# Patient Record
Sex: MALE | Race: WHITE | NOT HISPANIC OR LATINO | Employment: FULL TIME | URBAN - METROPOLITAN AREA
[De-identification: names, ages, dates, MRNs, and addresses within clinical notes are randomized per-mention and may not be internally consistent; named-entity substitution may affect disease eponyms.]

---

## 2018-05-04 ENCOUNTER — HOSPITAL ENCOUNTER (EMERGENCY)
Facility: MEDICAL CENTER | Age: 33
End: 2018-05-04
Attending: EMERGENCY MEDICINE
Payer: COMMERCIAL

## 2018-05-04 ENCOUNTER — APPOINTMENT (OUTPATIENT)
Dept: RADIOLOGY | Facility: MEDICAL CENTER | Age: 33
End: 2018-05-04
Attending: EMERGENCY MEDICINE
Payer: COMMERCIAL

## 2018-05-04 ENCOUNTER — NON-PROVIDER VISIT (OUTPATIENT)
Dept: OCCUPATIONAL MEDICINE | Facility: CLINIC | Age: 33
End: 2018-05-04
Payer: COMMERCIAL

## 2018-05-04 VITALS
HEART RATE: 77 BPM | BODY MASS INDEX: 29.86 KG/M2 | TEMPERATURE: 98 F | WEIGHT: 220.46 LBS | OXYGEN SATURATION: 98 % | DIASTOLIC BLOOD PRESSURE: 74 MMHG | SYSTOLIC BLOOD PRESSURE: 123 MMHG | HEIGHT: 72 IN | RESPIRATION RATE: 18 BRPM

## 2018-05-04 DIAGNOSIS — S62.610A CLOSED DISPLACED FRACTURE OF PROXIMAL PHALANX OF RIGHT INDEX FINGER, INITIAL ENCOUNTER: ICD-10-CM

## 2018-05-04 DIAGNOSIS — Z02.1 PRE-EMPLOYMENT DRUG SCREENING: ICD-10-CM

## 2018-05-04 DIAGNOSIS — Z02.83 ENCOUNTER FOR DRUG SCREENING: ICD-10-CM

## 2018-05-04 PROCEDURE — 82075 ASSAY OF BREATH ETHANOL: CPT | Performed by: INTERNAL MEDICINE

## 2018-05-04 PROCEDURE — 99284 EMERGENCY DEPT VISIT MOD MDM: CPT

## 2018-05-04 PROCEDURE — 80305 DRUG TEST PRSMV DIR OPT OBS: CPT | Performed by: INTERNAL MEDICINE

## 2018-05-04 PROCEDURE — 73140 X-RAY EXAM OF FINGER(S): CPT | Mod: RT

## 2018-05-04 PROCEDURE — 8899 PR URINE 11 PANEL - AFTER HOURS: Performed by: INTERNAL MEDICINE

## 2018-05-04 ASSESSMENT — PAIN SCALES - GENERAL
PAINLEVEL_OUTOF10: 3
PAINLEVEL_OUTOF10: 3

## 2018-05-04 NOTE — LETTER
FORM C-4:  EMPLOYEE’S CLAIM FOR COMPENSATION/ REPORT OF INITIAL TREATMENT  EMPLOYEE’S CLAIM - PROVIDE ALL INFORMATION REQUESTED   First Name  Hemanth Last Name  Rose Birthdate             Age  1985 32 y.o. 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 (if applicable)  Working on a vehicle    How did this injury or occupational disease occur? Be specific and answer in detail. Use additional sheet if necessary)  using a large hammer to drive axle into transmission /missed and hit Right hand   If you believe that you have an occupational disease, when did you first have knowledge of the disability and it relationship to your employment?  N/A Witnesses to the Accident  Joseph/beatriz/oscar     Nature of Injury or Occupational Disease  Workers' Compensation  Part(s) of Body Injured or Affected  Finger (R), N/A, N/A    I certify that the above is true and correct to the best of my knowledge and that I have provided this  information in order to obtain the benefits of [place] or [place] of [place]).  I hereby authorize any physician, chiropractor, surgeon, practitioner, or other person, any hospital, including [facility] or [facility], any medical service organization, any insurance company, or other institution or organization to release to each other, any medical or other information, including benefits paid or payable, pertinent to this injury or disease, except information relative to diagnosis, treatment and/or counseling for AIDS, psychological conditions, alcohol or controlled substances, for which I must give specific authorization.  A Photostat of this authorization shall be as valid as the original.   Date  05/04/2018 Place  [facility]   Employee’s Signature   THIS REPORT MUST BE COMPLETED AND MAILED WITHIN 3 WORKING DAYS OF TREATMENT   Place  [facility], EMERGENCY DEPT  Name of Facility   [facility]   Date  5/4/2018 Diagnosis  (S62.610A) Closed displaced fracture of proximal phalanx of right index finger, initial encounter Is there evidence the injured employee was under the influence of alcohol and/or another controlled substance at the time of accident?   Hour  9:59 PM Description of Injury or Disease  Closed displaced fracture of proximal phalanx of right index finger, initial encounter No   Treatment  Aluminum/foam splint  Have you advised the patient to remain off work five days or more?         No   X-Ray Findings  Positive  Comments:right index finger proximal phalanx linear fractures comminuted   If Yes   From Date    To Date      From information given by the employee, together with medical evidence, can you directly connect this injury or occupational disease as job incurred?  Yes If No, is the employee capable of: Full Duty  No Modified Duty  Yes   Is additional  "medical care by a physician indicated?  Yes  Comments:hand surgeon/orthopedist If Modified Duty, Specify any Limitations / Restrictions  No use of right index finger     Do you know of any previous injury or disease contributing to this condition or occupational disease?  No   Date  5/4/2018 Print Doctor’s Name  Rodolfo Govea certify the employer’s copy of this form was mailed on:   Address  [address] [place] [address] [phone] Insurer’s Use Only   [facility] [employer]  [id number]    Provider’s Tax ID Number  [id number] Telephone  Dept: [phone]    Doctor’s Signature  e-RODOLFO Newman D.O. Degree   MD    Original - TREATING PHYSICIAN OR CHIROPRACTOR   Pg 2-Insurer/TPA   Pg 3-Employer   Pg 4-Employee                                                                                                  Form C-4 (rev01/03)     BRIEF DESCRIPTION OF RIGHTS AND BENEFITS  (Pursuant to [place]RS 616C.050)    Notice of Injury or Occupational Disease (Incident Report Form C-1): If an injury or occupational disease (OD) arises out of and in the course of employment, you must provide written notice to your employer as soon as practicable, but no later than 7 days after the accident or OD. Your employer shall maintain a sufficient supply of the required forms.    Claim for Compensation (Form C-4): If medical treatment is sought, the form C-4 is available at the place of initial treatment. A completed \"Claim for Compensation\" (Form C-4) must be filed within 90 days after an accident or OD. The treating physician or chiropractor must, within 3 working days after treatment, complete and mail to the employer, the employer's insurer and third-party , the Claim for Compensation.    Medical Treatment: If you require medical treatment for your on-the-job injury or OD, you may be required to select a physician or chiropractor from a list provided by your workers’ compensation insurer, if it " has contracted with an Organization for Managed Care (MCO) or Preferred Provider Organization (PPO) or providers of health care. If your employer has not entered into a contract with an MCO or PPO, you may select a physician or chiropractor from the Panel of Physicians and Chiropractors. Any medical costs related to your industrial injury or OD will be paid by your insurer.    Temporary Total Disability (TTD): If your doctor has certified that you are unable to work for a period of at least 5 consecutive days, or 5 cumulative days in a 20-day period, or places restrictions on you that your employer does not accommodate, you may be entitled to TTD compensation.    Temporary Partial Disability (TPD): If the wage you receive upon reemployment is less than the compensation for TTD to which you are entitled, the insurer may be required to pay you TPD compensation to make up the difference. TPD can only be paid for a maximum of 24 months.    Permanent Partial Disability (PPD): When your medical condition is stable and there is an indication of a PPD as a result of your injury or OD, within 30 days, your insurer must arrange for an evaluation by a rating physician or chiropractor to determine the degree of your PPD. The amount of your PPD award depends on the date of injury, the results of the PPD evaluation and your age and wage.    Permanent Total Disability (PTD): If you are medically certified by a treating physician or chiropractor as permanently and totally disabled and have been granted a PTD status by your insurer, you are entitled to receive monthly benefits not to exceed 66 2/3% of your average monthly wage. The amount of your PTD payments is subject to reduction if you previously received a PPD award.    Vocational Rehabilitation Services: You may be eligible for vocational rehabilitation services if you are unable to return to the job due to a permanent physical impairment or permanent restrictions as a result  of your injury or occupational disease.    Transportation and Per Maylin Reimbursement: You may be eligible for travel expenses and per maylin associated with medical treatment.  Reopening: You may be able to reopen your claim if your condition worsens after claim closure.    Appeal Process: If you disagree with a written determination issued by the insurer or the insurer does not respond to your request, you may appeal to the Department of Administration, , by following the instructions contained in your determination letter. You must appeal the determination within 70 days from the date of the determination letter at [address], [address], [place], [place] [address], or [address], [address], [place], [place] [address]. If you disagree with the  decision, you may appeal to the Department of Administration, . You must file your appeal within 30 days from the date of the  decision letter at [address], [address], [place], [place] [address], or [address] [facility] [address], [place] [address]. If you disagree with a decision of an , you may file a petition for judicial review with the District Court. You must do so within 30 days of the Appeal Officer’s decision. You may be represented by an  at your own expense or you may contact the [facility] for possible representation.    [place]  for Injured Workers (NAIW): If you disagree with a  decision, you may request that NAIW represent you without charge at an  Hearing. For information regarding denial of benefits, you may contact the [facility] at: [address], [address] [address], [phone], or [address] [facility] [address], [place] [address], [phone]    To File a Complaint with the Division: If you wish to file a complaint with the  of the Division of Industrial Relations  (DIR), please contact the Workers’ Compensation Section, [address], [address], [place], [place] [address], telephone [phone]) [phone], or [address], [address], [place], [place] [address], telephone [phone].    For assistance with Workers’ Compensation Issues: you may contact the Office of the [facility] Consumer Health Assistance, [address] [place] [address], [address], [place], [place] [address], Toll Free 1-625.596.7055, Web site: http://govcha.[facility].nv., E-mail [email or web address].nv.                                                                                                                                                                               __________________________________________________________________                                    _________________            Employee Name / Signature                                                                                                                            Date                                       D-2 (rev. 10/07)

## 2018-05-05 NOTE — DISCHARGE INSTRUCTIONS
Finger Fracture  Fractures of fingers are breaks in the bones of the fingers. There are many types of fractures. There are different ways of treating these fractures. Your health care provider will discuss the best way to treat your fracture.  What are the causes?  Traumatic injury is the main cause of broken fingers. These include:  · Injuries while playing sports.  · Workplace injuries.  · Falls.  What increases the risk?  Activities that can increase your risk of finger fractures include:  · Sports.  · Workplace activities that involve machinery.  · A condition called osteoporosis, which can make your bones less dense and cause them to fracture more easily.  What are the signs or symptoms?  The main symptoms of a broken finger are pain and swelling within 15 minutes after the injury. Other symptoms include:  · Bruising of your finger.  · Stiffness of your finger.  · Numbness of your finger.  · Exposed bones (compound fracture) if the fracture is severe.  How is this diagnosed?  The best way to diagnose a broken bone is with X-ray imaging. Additionally, your health care provider will use this X-ray image to evaluate the position of the broken finger bones.  How is this treated?  Finger fractures can be treated with:  · Nonreduction--This means the bones are in place. The finger is splinted without changing the positions of the bone pieces. The splint is usually left on for about a week to 10 days. This will depend on your fracture and what your health care provider thinks.  · Closed reduction--The bones are put back into position without using surgery. The finger is then splinted.  · Open reduction and internal fixation--The fracture site is opened. Then the bone pieces are fixed into place with pins or some type of hardware. This is seldom required. It depends on the severity of the fracture.  Follow these instructions at home:  · Follow your health care provider's instructions regarding activities, exercises,  and physical therapy.  · Only take over-the-counter or prescription medicines for pain, discomfort, or fever as directed by your health care provider.  Contact a health care provider if:  You have pain or swelling that limits the motion or use of your fingers.  Get help right away if:  Your finger becomes numb.  This information is not intended to replace advice given to you by your health care provider. Make sure you discuss any questions you have with your health care provider.  Document Released: 04/01/2002 Document Revised: 05/25/2017 Document Reviewed: 07/30/2014  [employer] Interactive Patient Education © 2017 [employer] Inc.

## 2018-05-05 NOTE — ED NOTES
Patient was educated on discharge instructions.  Patient was informed about diagnosis, symptom management, risks, and home care instructions.  Patient verbalized understanding and signed discharge instructions. Copy of discharge instructions in chart.  Patient ambulated out with steady gait,  Patient has personal belongings and CD with images.

## 2018-05-05 NOTE — ED TRIAGE NOTES
Chief Complaint   Patient presents with   • Digit Pain     Pt was at work, hit finger with a hammer. R second digit swollen, limited ROM.     /73   Pulse 74   Temp 36.7 °C (98 °F) (Temporal)   Resp 16   Ht 1.829 m (6')   Wt 100 kg (220 lb 7.4 oz)   SpO2 98%   BMI 29.90 kg/m²     Pt Informed regarding triage process and verbalized understanding to inform triage tech or RN for any changes in condition.  Placed in lobby.

## 2018-05-05 NOTE — ED PROVIDER NOTES
ED Provider Note    Scribed for Asa Govea D.O. by Douglas Hutton. 5/4/2018  8:05 PM    Primary care provider: Pcp Pt States None  Means of arrival: Private vehicle  History obtained from: Patient  History limited by: None    CHIEF COMPLAINT  Chief Complaint   Patient presents with   • Digit Pain     HPI  Hemanth Rose is a 32 y.o. male who presents to the Emergency Department with right 2nd finger pain. The patient was at work about one hour prior to arrival when he accidentally hit his right 2nd finger digit with a hammer. He complains of constant right 2nd finger pain since the injury which is worst in severity over the his right knuckle. The patient denies medicating his pain with any medications.   No loss of sensation or loss of strength.   The patient is left hand dominant.    REVIEW OF SYSTEMS  Pertinent positives include right index finger pain. Pertinent negatives include no loss of sensation or strength.      PAST MEDICAL HISTORY  Past Medical History:   Diagnosis Date   • Patient denies medical problems      SURGICAL HISTORY  History reviewed. No pertinent surgical history.     SOCIAL HISTORY  Social History   Substance Use Topics   • Smoking status: Never Smoker   • Smokeless tobacco: Never Used   • Alcohol use No      History   Drug Use No     FAMILY HISTORY  History reviewed. No pertinent family history.    CURRENT MEDICATIONS  Home Medications     Reviewed by Christina Crews R.N. (Registered Nurse) on 05/04/18 at 1814  Med List Status: <None>   Medication Last Dose Status        Patient Gilles Taking any Medications                     ALLERGIES  No Known Allergies    PHYSICAL EXAM  VITAL SIGNS: /73   Pulse 74   Temp 36.7 °C (98 °F) (Temporal)   Resp 16   Ht 1.829 m (6')   Wt 100 kg (220 lb 7.4 oz)   SpO2 98%   BMI 29.90 kg/m²     Constitutional: Well developed, Well nourished, No acute distress, Non-toxic appearance.   Skin: Warm, Dry, No erythema, slight ecchymosis on the right  index finger the proximal phalanx   Extremities: Edema and tenderness to proximal phalanx of right index finger. Distal cap refill less than 2 sec.   Neurologic: Alert & oriented x 3,  No focal deficits noted, normal gait. Strength and sensation intact.     DIAGNOSTIC STUDIES/PROCEDURES    RADIOLOGY  DX-FINGER(S) 2+ RIGHT   Final Result         1.  Hairline index finger proximal phalanx diaphyseal fracture with mild comminution.        The radiologist's interpretation of all radiological studies have been reviewed by me.    COURSE & MEDICAL DECISION MAKING  Nursing notes, VS, PMSFHx reviewed in chart.     8:05 PM - Patient seen and examined at bedside. Ordered finger x-ray to evaluate his symptoms.     This is a charming 32 y.o. male that presents with slated fracture to the proximal phalanx of the right index finger. The patient has no neurological vascular deficits. He is placed in a finger immobilizer/aluminum/foam splint. The patient will be following up with Workmen's Compensation for further evaluation and management and he did not want any pain medication.      DISPOSITION:  Patient will be discharged home in stable condition.    FOLLOW UP:  [address] [address]  [phone]    Schedule an appointment as soon as possible for a visit in 1 day      [facility], Emergency Dept  [address] [address] [phone]    If symptoms worsen      OUTPATIENT MEDICATIONS:  New Prescriptions    No medications on file     FINAL IMPRESSION  1. Closed displaced fracture of proximal phalanx of right index finger, initial encounter          Douglas SPEAR (Hector), am scribing for, and in the presence of, Asa Govea D.O.    Electronically signed by: Douglas Hutton (Hector), 5/4/2018    Asa SPEAR D.O. personally performed the services described in this documentation, as scribed by Douglas Hutton in my presence, and it is both accurate  and complete.    The note accurately reflects work and decisions made by me.  Asa Govea  5/4/2018  10:22 PM

## 2018-05-07 ENCOUNTER — OCCUPATIONAL MEDICINE (OUTPATIENT)
Dept: OCCUPATIONAL MEDICINE | Facility: CLINIC | Age: 33
End: 2018-05-07
Payer: COMMERCIAL

## 2018-05-07 VITALS
DIASTOLIC BLOOD PRESSURE: 82 MMHG | TEMPERATURE: 98 F | BODY MASS INDEX: 29.8 KG/M2 | HEIGHT: 72 IN | HEART RATE: 86 BPM | WEIGHT: 220 LBS | SYSTOLIC BLOOD PRESSURE: 138 MMHG | RESPIRATION RATE: 16 BRPM | OXYGEN SATURATION: 98 %

## 2018-05-07 DIAGNOSIS — S62.640D CLOSED NONDISPLACED FRACTURE OF PROXIMAL PHALANX OF RIGHT INDEX FINGER WITH ROUTINE HEALING, SUBSEQUENT ENCOUNTER: ICD-10-CM

## 2018-05-07 PROCEDURE — 99202 OFFICE O/P NEW SF 15 MIN: CPT | Performed by: PREVENTIVE MEDICINE

## 2018-05-07 ASSESSMENT — PAIN SCALES - GENERAL: PAINLEVEL: 3=SLIGHT PAIN

## 2018-05-07 NOTE — LETTER
[facility]  [address]   [address] - [place] [address]  Phone:  [phone] - Fax:  [phone]   Occupational Health [facility] Progress Report and Disability Certification  Date of Service: 5/7/2018   No Show:  No  Date / Time of Next Visit: 5/14/2018@1:00pm   Claim Information   Patient Name: Hemanth Rose  Claim Number:     Employer: [employer]  Date of Injury: 5/4/2018     Insurer / TPA: [employer]  ID / SSN:     Occupation: Technician  Diagnosis: The encounter diagnosis was Closed nondisplaced fracture of proximal phalanx of right index finger with routine healing, subsequent encounter.    Medical Information   Related to Industrial Injury? Yes    Subjective Complaints:  DOI 5/4/2018: 31 yo male presents for right index finger injury. He accidentally hit his right index finger digit with a hammer. He was seen in the ER where they noted a he was seen in the ER where x-rays were performed and found slightly comminuted fracture of the proximal phalanx. He was given an aluminum finger splint and advised to follow up with occupational health. Patient states that overall the pain is somewhat reduced however discontinue have pain. He has not tried to move it very much. He states he has not required any medications for the pain. 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 Denies any numbness or tingling. Denies open wounds.   Objective Findings: Right index finger: Swelling over the proximal phalanx. Tender over the proximal phalanx. Limited MCP and IP joint movement. Reports slight decrease in sensation in the radial aspect of digit.   Pre-Existing Condition(s):     Assessment:   Condition Improved    Status: Additional Care Required  Permanent Disability:No    Plan:      Diagnostics:      Comments:  Continue aluminum finger splint. May do gentle range of motion exercises once to twice per day.  OTC ibuprofen and/or Tylenol as  needed  Restricted duty  Follow-up one week    Disability Information   Status: Released to Restricted Duty    From:  5/7/2018  Through: 5/14/2018 Restrictions are: Temporary   Physical Restrictions   Sitting:    Standing:    Stooping:    Bending:      Squatting:    Walking:    Climbing:    Pushing:      Pulling:    Other:    Reaching Above Shoulder (L):   Reaching Above Shoulder (R):       Reaching Below Shoulder (L):    Reaching Below Shoulder (R):      Not to exceed Weight Limits   Carrying(hrs):   Weight Limit(lb):   Lifting(hrs):   Weight  Limit(lb):     Comments: Accommodate for finger splint. Limited use of the right hand as tolerated.    Repetitive Actions   Hands: i.e. Fine Manipulations from Grasping:     Feet: i.e. Operating Foot Controls:     Driving / Operate Machinery:     Physician Name: Fernando Flores D.O. Physician Signature: FERNANDO Page D.O. e-Signature: Dr. Todd Chappell, Medical Director   Clinic Name / Location: [address],   [address] [place] [address] Clinic Phone Number: Dept: [phone]   Appointment Time: 2:30 Pm Visit Start Time: 2:47 PM   Check-In Time:  2:12 Pm Visit Discharge Time:  3:26pm   Original-Treating Physician or Chiropractor    Page 2-Insurer/TPA    Page 3-Employer    Page 4-Employee

## 2018-05-07 NOTE — PROGRESS NOTES
Subjective:      Hemanth Rose is a 32 y.o. male who presents with Other (WC [employer] DOI 5/4/18, (R) first finger, better, rm 25)      DOI 5/4/2018: 31 yo male presents for right index finger injury. He accidentally hit his right index finger digit with a hammer. He was seen in the ER where they noted a he was seen in the ER where x-rays were performed and found slightly comminuted fracture of the proximal phalanx. He was given an aluminum finger splint and advised to follow up with occupational health. Patient states that overall the pain is somewhat reduced however discontinue have pain. He has not tried to move it very much. He states he has not required any medications for the pain. 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 Denies any numbness or tingling. Denies open wounds.     HPI    ROS  ROS: All systems were reviewed on intake form, form was reviewed and signed. See scanned documents in media. Pertinent positives and negatives included in HPI.    PMH: No pertinent past medical history to this problem  MEDS: Medications were reviewed in Epic  ALLERGIES: No Known Allergies  SOCHX: Works as a technician at [employer]   FH: No pertinent family history to this problem     Objective:     /82   Pulse 86   Temp 36.7 °C (98 °F)   Resp 16   Ht 1.829 m (6')   Wt 99.8 kg (220 lb)   SpO2 98%   BMI 29.84 kg/m²      Physical Exam   Constitutional: He is oriented to person, place, and time. He appears well-developed and well-nourished.   Cardiovascular: Normal rate.    Pulmonary/Chest: Effort normal.   Neurological: He is alert and oriented to person, place, and time.   Skin: Skin is warm and dry.   Psychiatric: He has a normal mood and affect. Judgment normal.       Right index finger: Swelling over the proximal phalanx. Tender over the proximal phalanx. Limited MCP and IP joint movement. Reports slight decrease in sensation in the radial aspect of digit.       Assessment/Plan:      1. Closed nondisplaced fracture of proximal phalanx of right index finger with routine healing, subsequent encounter  Continue aluminum finger splint. May do gentle range of motion exercises once to twice per day.   OTC ibuprofen and/or Tylenol as needed   Restricted duty   Follow-up one week

## 2018-05-08 LAB
AMP AMPHETAMINE: NORMAL
BAR BARBITURATES: NORMAL
BREATH ALCOHOL COMMENT: NORMAL
BZO BENZODIAZEPINES: NORMAL
COC COCAINE: NORMAL
INT CON NEG: NEGATIVE
INT CON POS: POSITIVE
MDMA ECSTASY: NORMAL
MET METHAMPHETAMINES: NORMAL
MTD METHADONE: NORMAL
OPI OPIATES: NORMAL
OXY OXYCODONE: NORMAL
PCP PHENCYCLIDINE: NORMAL
POC BREATHALIZER: 0 PERCENT (ref 0–0.01)
POC URINE DRUG SCREEN OCDRS: NORMAL
THC: NORMAL

## 2018-05-14 ENCOUNTER — OCCUPATIONAL MEDICINE (OUTPATIENT)
Dept: OCCUPATIONAL MEDICINE | Facility: CLINIC | Age: 33
End: 2018-05-14
Payer: COMMERCIAL

## 2018-05-14 VITALS
SYSTOLIC BLOOD PRESSURE: 140 MMHG | TEMPERATURE: 97.5 F | OXYGEN SATURATION: 94 % | HEART RATE: 97 BPM | DIASTOLIC BLOOD PRESSURE: 90 MMHG

## 2018-05-14 DIAGNOSIS — S62.640D CLOSED NONDISPLACED FRACTURE OF PROXIMAL PHALANX OF RIGHT INDEX FINGER WITH ROUTINE HEALING: ICD-10-CM

## 2018-05-14 PROCEDURE — 99213 OFFICE O/P EST LOW 20 MIN: CPT | Performed by: PREVENTIVE MEDICINE

## 2018-05-14 ASSESSMENT — ENCOUNTER SYMPTOMS
SENSORY CHANGE: 1
TINGLING: 0

## 2018-05-14 NOTE — PROGRESS NOTES
Subjective:      Hemanth Rose is a 32 y.o. male who presents with Other (DOi 05/01/18- R hand - better- PRO 1 )      DOI 5/4/2018: 31 yo male presents for right index finger injury. He accidentally hit his right index finger with a hammer. XR found slightly comminuted fracture of the proximal phalanx. Is noted gradual improvement in the swelling and pain of the finger. He states it's much less painful. Still gets occasional numbness and tenderness. Pain is still most of the proximal phalanx and worsened with palpation. He has been using finger splint while at work or doing activities.     HPI    Review of Systems   Neurological: Positive for sensory change. Negative for tingling.     SOCHX: Works as a technician at [employer]   FH: No pertinent family history to this problem.     Objective:     /90   Pulse 97   Temp 36.4 °C (97.5 °F)   SpO2 94%      Physical Exam   Constitutional: He is oriented to person, place, and time. He appears well-developed and well-nourished.   Cardiovascular: Normal rate.    Pulmonary/Chest: Effort normal.   Neurological: He is alert and oriented to person, place, and time.   Skin: Skin is warm and dry.   Psychiatric: He has a normal mood and affect.       Right index finger: Minimal over the proximal phalanx. Mild tenderness over the proximal phalanx. Limited MCP and IP joint movement. Reports slight decrease in sensation in the radial aspect of digit.       Assessment/Plan:     1. Closed nondisplaced fracture of proximal phalanx of right index finger with routine healing  Continue aluminum finger splint while at work or doing activities with significant use of the right hand  Daily gentle stretches and  exercises  OTC ibuprofen and/or Tylenol as needed  Continue restricted duty  Follow-up 2 weeks

## 2018-05-14 NOTE — LETTER
[facility] [place]  [address],   [address] - [place] [address]  Phone:  [phone] - Fax:  [phone]   Occ[facility]ational [facility] Progress Report and Disability Certification  Date of Service: 5/14/2018   No Show:  No  Date / Time of Next Visit: 5/29/2018@10:15 AM   Claim Information   Patient Name: Hemanth Rose  Claim Number:     Employer: [employer]  Date of Injury: 5/4/2018     Insurer / TPA: [employer]  ID / SSN:     Occupation: Technician  Diagnosis: The encounter diagnosis was Closed nondisplaced fracture of proximal phalanx of right index finger with routine healing.    Medical Information   Related to Industrial Injury? Yes    Subjective Complaints:  DOI 5/4/2018: 33 yo male presents for right index finger injury. He accidentally hit his right index finger with a hammer. XR found slightly comminuted fracture of the proximal phalanx. Is noted gradual improvement in the swelling and pain of the finger. He states it's much less painful. Still gets occasional numbness and tenderness. Pain is still most of the proximal phalanx and worsened with palpation. He has been using finger splint while at work or doing activities.   Objective Findings: Right index finger: Minimal over the proximal phalanx. Mild tenderness over the proximal phalanx. Limited MCP and IP joint movement. Reports slight decrease in sensation in the radial aspect of digit.   Pre-Existing Condition(s):     Assessment:   Condition Improved    Status: Additional Care Required  Permanent Disability:No    Plan:      Diagnostics:      Comments:  Continue aluminum finger splint while at work or doing activities with significant use of the right hand  Daily gentle stretches and  exercises  OTC ibuprofen and/or Tylenol as needed  Continue restricted duty  Follow-up 2 weeks    Disability Information   Status: Released to Restricted Duty    From:  5/14/2018  Through: 5/29/2018 Restrictions are: Temporary      Physical Restrictions   Sitting:    Standing:    Stooping:    Bending:      Squatting:    Walking:    Climbing:    Pushing:      Pulling:    Other:    Reaching Above Shoulder (L):   Reaching Above Shoulder (R):       Reaching Below Shoulder (L):    Reaching Below Shoulder (R):      Not to exceed Weight Limits   Carrying(hrs):   Weight Limit(lb):   Lifting(hrs):   Weight  Limit(lb):     Comments: Accommodate for finger splint. Limited use of the right hand as tolerated.    Repetitive Actions   Hands: i.e. Fine Manipulations from Grasping:     Feet: i.e. Operating Foot Controls:     Driving / Operate Machinery:     Physician Name: Fernando Flores D.O. Physician Signature: FERNANDO Page D.O. e-Signature: Dr. Todd Chappell, Medical Director   Clinic Name / Location: [address],   [address] [place] [address] Clinic Phone Number: Dept: [phone]   Appointment Time: 1:00 Pm Visit Start Time: 1:07 PM   Check-In Time:  12:52 Pm Visit Discharge Time:  1:19 PM   Original-Treating Physician or Chiropractor    Page 2-Insurer/TPA    Page 3-Employer    Page 4-Employee

## 2018-06-08 ENCOUNTER — APPOINTMENT (OUTPATIENT)
Dept: RADIOLOGY | Facility: IMAGING CENTER | Age: 33
End: 2018-06-08
Attending: PREVENTIVE MEDICINE
Payer: COMMERCIAL

## 2018-06-08 ENCOUNTER — OCCUPATIONAL MEDICINE (OUTPATIENT)
Dept: OCCUPATIONAL MEDICINE | Facility: CLINIC | Age: 33
End: 2018-06-08
Payer: COMMERCIAL

## 2018-06-08 VITALS
TEMPERATURE: 98.2 F | RESPIRATION RATE: 16 BRPM | HEIGHT: 72 IN | BODY MASS INDEX: 29.8 KG/M2 | HEART RATE: 80 BPM | OXYGEN SATURATION: 97 % | WEIGHT: 220 LBS | DIASTOLIC BLOOD PRESSURE: 88 MMHG | SYSTOLIC BLOOD PRESSURE: 132 MMHG

## 2018-06-08 DIAGNOSIS — S62.640D CLOSED NONDISPLACED FRACTURE OF PROXIMAL PHALANX OF RIGHT INDEX FINGER WITH ROUTINE HEALING, SUBSEQUENT ENCOUNTER: ICD-10-CM

## 2018-06-08 PROCEDURE — 99213 OFFICE O/P EST LOW 20 MIN: CPT | Performed by: PREVENTIVE MEDICINE

## 2018-06-08 PROCEDURE — 73140 X-RAY EXAM OF FINGER(S): CPT | Mod: TC,RT,29 | Performed by: PHYSICIAN ASSISTANT

## 2018-06-08 ASSESSMENT — ENCOUNTER SYMPTOMS
TINGLING: 0
SENSORY CHANGE: 0

## 2018-06-08 ASSESSMENT — PAIN SCALES - GENERAL: PAINLEVEL: 1=MINIMAL PAIN

## 2018-06-08 NOTE — LETTER
[facility] [place]  [address],   [address] - [place] [address]  Phone:  [phone] - Fax:  [phone]   [facility] Progress Report and Disability Certification  Date of Service: 6/8/2018   No Show:  No  Date / Time of Next Visit: 6/22/2018@4:40 PM   Claim Information   Patient Name: Hemanth Rose  Claim Number:     Employer: [employer]  Date of Injury: 5/4/2018     Insurer / TPA: [employer]  ID / SSN:     Occupation: Technician  Diagnosis: The encounter diagnosis was Closed nondisplaced fracture of proximal phalanx of right index finger with routine healing, subsequent encounter.    Medical Information   Related to Industrial Injury? Yes    Subjective Complaints:  DOI 5/4/2018: 33 yo male presents for right index finger injury. He accidentally hit his right index finger with a hammer. XR found slightly comminuted fracture of the proximal phalanx. Patient states overall right index finger feels much improved. He states he has minimal pain with palpation. He states he has been able to  well. He does note continued swelling at the base of the finger. Denies any radiating pain, numbness or tingling. Has not required any medications. He states he is essentially doing his normal job at this point.   Objective Findings: Right index finger: Mild swelling over the proximal phalanx. Minimal tenderness over the proximal phalanx. Very slight reduction in MCP flexion. Sensation intact.   Pre-Existing Condition(s):     Assessment:   Condition Improved    Status: Additional Care Required  Permanent Disability:No    Plan:      Diagnostics:      Comments:  Repeat x-ray right index finger: Shows unchanged comminuted fracture of the proximal phalanx with minimal displacement, as read by me. Awaiting radiology interpretation.  Continue restricted duty  Recommend aluminum finger splint while at work  OTC i  buprofen as needed  Follow-up 2 weeks    Disability Information      Status: Released to Restricted Duty    From:  6/8/2018  Through: 6/22/2018 Restrictions are: Temporary   Physical Restrictions   Sitting:    Standing:    Stooping:    Bending:      Squatting:    Walking:    Climbing:    Pushing:      Pulling:    Other:    Reaching Above Shoulder (L):   Reaching Above Shoulder (R):       Reaching Below Shoulder (L):    Reaching Below Shoulder (R):      Not to exceed Weight Limits   Carrying(hrs):   Weight Limit(lb):   Lifting(hrs):   Weight  Limit(lb):     Comments: Accommodate for finger splint. Limited use of the right hand as tolerated.     Repetitive Actions   Hands: i.e. Fine Manipulations from Grasping:     Feet: i.e. Operating Foot Controls:     Driving / Operate Machinery:     Physician Name: Fernando Flores D.O. Physician Signature: FERNANDO Page D.O. e-Signature: Dr. Todd Chappell, Medical Director   Clinic Name / Location: [address],   [address] [place] [address] Clinic Phone Number: Dept: [phone]   Appointment Time: 3:30 Pm Visit Start Time: 3:04 PM   Check-In Time:  3:00 Pm Visit Discharge Time:  4:11 PM   Original-Treating Physician or Chiropractor    Page 2-Insurer/TPA    Page 3-Employer    Page 4-Employee

## 2018-07-16 ENCOUNTER — OCCUPATIONAL MEDICINE (OUTPATIENT)
Dept: OCCUPATIONAL MEDICINE | Facility: CLINIC | Age: 33
End: 2018-07-16
Payer: COMMERCIAL

## 2018-07-16 VITALS
HEART RATE: 72 BPM | WEIGHT: 226 LBS | HEIGHT: 72 IN | OXYGEN SATURATION: 95 % | RESPIRATION RATE: 16 BRPM | BODY MASS INDEX: 30.61 KG/M2 | DIASTOLIC BLOOD PRESSURE: 72 MMHG | TEMPERATURE: 97.9 F | SYSTOLIC BLOOD PRESSURE: 118 MMHG

## 2018-07-16 DIAGNOSIS — S62.660D CLOSED NONDISPLACED FRACTURE OF DISTAL PHALANX OF RIGHT INDEX FINGER WITH ROUTINE HEALING, SUBSEQUENT ENCOUNTER: ICD-10-CM

## 2018-07-16 PROCEDURE — 99212 OFFICE O/P EST SF 10 MIN: CPT | Performed by: PREVENTIVE MEDICINE

## 2018-07-16 NOTE — PROGRESS NOTES
Subjective:      Hemanth Rose is a 32 y.o. male who presents with Other (Rm 2 - R Index Finger - Better - 5/1/18)      DOI 5/4/2018: 31 yo male presents for right index finger injury. He accidentally hit his right index finger with a hammer. XR found slightly comminuted fracture of the proximal phalanx. Patient states overall right index finger feels much improved.  Patient has significant improvement in symptoms.  He states he has no pain at this point.  He does note a slight pole when he fully flexes the index finger.  He also notes that proximal phalanx does seem to be slightly swollen.  He has been able to work full duty without difficulty.     HPI    ROS       Objective:     /72   Pulse 72   Temp 36.6 °C (97.9 °F)   Resp 16   Ht 1.829 m (6')   Wt 102.5 kg (226 lb)   SpO2 95%   BMI 30.65 kg/m²      Physical Exam    Right index finger: Minimal swelling proximal phalanx no tenderness to palpation.  Full range of motion.   strength intact. Sensation intact.       Assessment/Plan:     1. Closed nondisplaced fracture of distal phalanx of right index finger with routine healing, subsequent encounter  Released from care  Full duty  Follow-up as needed

## 2018-07-16 NOTE — LETTER
[facility] [place]  [address],   [address] [place] [address]  Phone:  [phone] - Fax:  [phone]   [facility] Progress Report and Disability Certification  Date of Service: 7/16/2018   No Show:  No  Date / Time of Next Visit:  Release from care   Claim Information   Patient Name: Hemanth Rose  Claim Number:     Employer: [employer]  Date of Injury: 5/4/2018     Insurer / TPA: [employer]  ID / SSN:     Occupation: Technician  Diagnosis: The encounter diagnosis was Closed nondisplaced fracture of distal phalanx of right index finger with routine healing, subsequent encounter.    Medical Information   Related to Industrial Injury? Yes    Subjective Complaints:  DOI 5/4/2018: 33 yo male presents for right index finger injury. He accidentally hit his right index finger with a hammer. XR found slightly comminuted fracture of the proximal phalanx. Patient states overall right index finger feels much improved.  Patient has significant improvement in symptoms.  He states he has no pain at this point.  He does note a slight pole when he fully flexes the index finger.  He also notes that proximal phalanx does seem to be slightly swollen.  He has been able to work full duty without difficulty.   Objective Findings: Right index finger: Minimal swelling proximal phalanx no tenderness to palpation.  Full range of motion.   strength intact. Sensation intact.   Pre-Existing Condition(s):     Assessment:   Condition Improved    Status: Discharged /  MMI  Permanent Disability:No    Plan:      Diagnostics:      Comments:  Released from care  Full duty  Follow-up as needed    Disability Information   Status: Released to Full Duty    From:  7/16/2018  Through:   Restrictions are:     Physical Restrictions   Sitting:    Standing:    Stooping:    Bending:      Squatting:    Walking:    Climbing:    Pushing:      Pulling:    Other:    Reaching Above Shoulder (L):   Reaching  Above Shoulder (R):       Reaching Below Shoulder (L):    Reaching Below Shoulder (R):      Not to exceed Weight Limits   Carrying(hrs):   Weight Limit(lb):   Lifting(hrs):   Weight  Limit(lb):     Comments:      Repetitive Actions   Hands: i.e. Fine Manipulations from Grasping:     Feet: i.e. Operating Foot Controls:     Driving / Operate Machinery:     Physician Name: Fernando Flores D.O. Physician Signature: [email or web address] GARCIA D.O. e-Signature: Dr. Todd Chappell, Medical Director   Clinic Name / Location: [address],   [address]  [place] [address] Clinic Phone Number: Dept: [phone]   Appointment Time: 2:10 Pm Visit Start Time: 2:41 PM   Check-In Time:  2:22 Pm Visit Discharge Time:  3:27 PM   Original-Treating Physician or Chiropractor    Page 2-Insurer/TPA    Page 3-Employer    Page 4-Employee

## 2025-04-23 NOTE — PROGRESS NOTES
Subjective:      Hemanth Rose is a 32 y.o. male who presents with Follow-Up (WC DOI 05/01/18- R hand better Room #2)      DOI 5/4/2018: 33 yo male presents for right index finger injury. He accidentally hit his right index finger with a hammer. XR found slightly comminuted fracture of the proximal phalanx. Patient states overall right index finger feels much improved. He states he has minimal pain with palpation. He states he has been able to  well. He does note continued swelling at the base of the finger. Denies any radiating pain, numbness or tingling. Has not required any medications. He states he is essentially doing his normal job at this point.     HPI    Review of Systems   Neurological: Negative for tingling and sensory change.     SOCHX: Works as a [employer]  FH: No pertinent family history to this problem.     Objective:     /88   Pulse 80   Temp 36.8 °C (98.2 °F)   Resp 16   Ht 1.829 m (6')   Wt 99.8 kg (220 lb)   SpO2 97%   BMI 29.84 kg/m²      Physical Exam   Constitutional: He is oriented to person, place, and time. He appears well-developed and well-nourished.   Cardiovascular: Normal rate.    Pulmonary/Chest: Effort normal.   Neurological: He is alert and oriented to person, place, and time.   Skin: Skin is warm and dry.   Psychiatric: He has a normal mood and affect.       Right index finger: Mild swelling over the proximal phalanx. Minimal tenderness over the proximal phalanx. Very slight reduction in MCP flexion. Sensation intact.       Assessment/Plan:     1. Closed nondisplaced fracture of proximal phalanx of right index finger with routine healing, subsequent encounter  - DX-FINGER(S) 2+ RIGHT; Future    Repeat x-ray right index finger: Shows unchanged comminuted fracture of the proximal phalanx with minimal displacement, as read by me. Awaiting radiology interpretation.  Continue restricted duty  Recommend aluminum finger splint while at work  OTC ibuprofen as  needed  Follow-up 2 weeks   2.04